# Patient Record
Sex: FEMALE | Race: WHITE | NOT HISPANIC OR LATINO | Employment: PART TIME | URBAN - NONMETROPOLITAN AREA
[De-identification: names, ages, dates, MRNs, and addresses within clinical notes are randomized per-mention and may not be internally consistent; named-entity substitution may affect disease eponyms.]

---

## 2023-03-13 ENCOUNTER — OFFICE VISIT (OUTPATIENT)
Dept: URGENT CARE | Facility: CLINIC | Age: 48
End: 2023-03-13

## 2023-03-13 VITALS
SYSTOLIC BLOOD PRESSURE: 118 MMHG | HEART RATE: 92 BPM | RESPIRATION RATE: 19 BRPM | DIASTOLIC BLOOD PRESSURE: 62 MMHG | TEMPERATURE: 97.9 F | OXYGEN SATURATION: 98 %

## 2023-03-13 DIAGNOSIS — J01.40 ACUTE NON-RECURRENT PANSINUSITIS: ICD-10-CM

## 2023-03-13 DIAGNOSIS — J20.8 ACUTE BRONCHITIS DUE TO OTHER SPECIFIED ORGANISMS: Primary | ICD-10-CM

## 2023-03-13 RX ORDER — METHYLPREDNISOLONE 4 MG/1
TABLET ORAL
Qty: 21 EACH | Refills: 0 | Status: SHIPPED | OUTPATIENT
Start: 2023-03-13

## 2023-03-13 RX ORDER — FLUTICASONE PROPIONATE 50 MCG
1 SPRAY, SUSPENSION (ML) NASAL DAILY
Qty: 10 ML | Refills: 0 | Status: SHIPPED | OUTPATIENT
Start: 2023-03-13

## 2023-03-13 RX ORDER — HYDROCHLOROTHIAZIDE 25 MG/1
25 TABLET ORAL DAILY
COMMUNITY
Start: 2022-04-08 | End: 2023-04-08

## 2023-03-13 RX ORDER — BENZONATATE 200 MG/1
200 CAPSULE ORAL 3 TIMES DAILY PRN
Qty: 20 CAPSULE | Refills: 0 | Status: SHIPPED | OUTPATIENT
Start: 2023-03-13

## 2023-03-13 RX ORDER — LOSARTAN POTASSIUM 50 MG/1
50 TABLET ORAL DAILY
COMMUNITY
Start: 2022-04-08 | End: 2023-04-08

## 2023-03-13 RX ORDER — AZITHROMYCIN 250 MG/1
TABLET, FILM COATED ORAL
Qty: 6 TABLET | Refills: 0 | Status: SHIPPED | OUTPATIENT
Start: 2023-03-13 | End: 2023-03-17

## 2023-03-13 RX ORDER — ALBUTEROL SULFATE 90 UG/1
2 AEROSOL, METERED RESPIRATORY (INHALATION)
COMMUNITY
Start: 2022-11-25

## 2023-03-13 NOTE — PROGRESS NOTES
[facility]        NAME: Ten Reeder is a 41 y o  female  : 1975    MRN: [id number]  DATE: 2023  TIME: 11:11 AM    Assessment and Plan   Acute bronchitis due to other specified organisms [J20 8]  1  Acute bronchitis due to other specified organisms  azithromycin (ZITHROMAX) 250 mg tablet    methylPREDNISolone 4 MG tablet therapy pack    fluticasone (FLONASE) 50 mcg/act nasal spray    benzonatate (TESSALON) 200 MG capsule      2  Acute non-recurrent pansinusitis  azithromycin (ZITHROMAX) 250 mg tablet    methylPREDNISolone 4 MG tablet therapy pack    fluticasone (FLONASE) 50 mcg/act nasal spray    benzonatate (TESSALON) 200 MG capsule            Patient Instructions       Follow up with PCP in 3-5 days  Proceed to  ER if symptoms worsen  Chief Complaint     Chief Complaint   Patient presents with   • Cough   • sinus congestion   • Nausea     Started 2 weeks ago, started to feel better but now symptoms are becoming worse  Had diarrhea (resolved)  OTC diabetic robitussin             History of Present Illness       HPI-patient presents with 2 weeks of sinus pressure congestion now with cough and nausea states her signs and symptoms have been off and on for the last 2 to 3 weeks    Review of Systems   Review of Systems   Constitutional: Positive for fatigue  HENT: Positive for congestion, ear pain, postnasal drip, rhinorrhea, sinus pain and sore throat  Eyes: Negative  Respiratory: Positive for cough  Cardiovascular: Negative  Gastrointestinal: Negative  Endocrine: Negative  Genitourinary: Negative  Musculoskeletal: Negative  Neurological: Positive for headaches  Hematological: Negative  Psychiatric/Behavioral: Negative            Current Medications       Current Outpatient Medications:   •  albuterol (PROVENTIL HFA,VENTOLIN HFA) 90 mcg/act inhaler, Inhale 2 puffs, Disp: , Rfl:   •  azithromycin (ZITHROMAX) 250 mg tablet, Take 2 tablets today then 1 tablet daily x 4 days, Disp: 6 tablet, Rfl: 0  •  benzonatate (TESSALON) 200 MG capsule, Take 1 capsule (200 mg total) by mouth 3 (three) times a day as needed for cough, Disp: 20 capsule, Rfl: 0  •  fluticasone (FLONASE) 50 mcg/act nasal spray, 1 spray into each nostril daily, Disp: 10 mL, Rfl: 0  •  hydrochlorothiazide (HYDRODIURIL) 25 mg tablet, Take 25 mg by mouth daily, Disp: , Rfl:   •  losartan (COZAAR) 50 mg tablet, Take 50 mg by mouth daily, Disp: , Rfl:   •  methylPREDNISolone 4 MG tablet therapy pack, Use as directed on package, Disp: 21 each, Rfl: 0    Current Allergies     Allergies as of 03/13/2023 - Reviewed 03/13/2023   Allergen Reaction Noted   • Amoxicillin-pot clavulanate Diarrhea and Hives 03/02/2012   • Bee venom Rash 10/19/2011            The following portions of the patient's history were reviewed and updated as appropriate: allergies, current medications, past family history, past medical history, past social history, past surgical history and problem list      Past Medical History:   Diagnosis Date   • Asthma    • Diabetes mellitus ([facility] [address] )    • Hypertension        History reviewed  No pertinent surgical history  No family history on file  Medications have been verified  Objective   /62   Pulse 92   Temp 97 9 °F (36 6 °C)   Resp 19   SpO2 98%        Physical Exam     Physical Exam  HENT:      Right Ear: Tympanic membrane normal       Left Ear: Tympanic membrane normal       Nose: Congestion and rhinorrhea present  Mouth/Throat:      Pharynx: Posterior oropharyngeal erythema present  Eyes:      Conjunctiva/sclera: Conjunctivae normal    Cardiovascular:      Rate and Rhythm: Normal rate and regular rhythm  Pulses: Normal pulses  Heart sounds: Normal heart sounds  Pulmonary:      Breath sounds: Wheezing and rhonchi present  Musculoskeletal:         General: Normal range of motion  Cervical back: Normal range of motion     Skin:     General: Skin is warm  Neurological:      General: No focal deficit present  Mental Status: She is alert     Psychiatric:         Mood and Affect: Mood normal

## 2023-04-01 ENCOUNTER — OFFICE VISIT (OUTPATIENT)
Dept: URGENT CARE | Facility: CLINIC | Age: 48
End: 2023-04-01

## 2023-04-01 VITALS
HEIGHT: 59 IN | BODY MASS INDEX: 29.43 KG/M2 | SYSTOLIC BLOOD PRESSURE: 122 MMHG | RESPIRATION RATE: 18 BRPM | HEART RATE: 88 BPM | WEIGHT: 146 LBS | OXYGEN SATURATION: 98 % | TEMPERATURE: 98 F | DIASTOLIC BLOOD PRESSURE: 80 MMHG

## 2023-04-01 DIAGNOSIS — R05.1 ACUTE COUGH: ICD-10-CM

## 2023-04-01 DIAGNOSIS — J06.9 ACUTE URI: ICD-10-CM

## 2023-04-01 DIAGNOSIS — J20.9 ACUTE BRONCHITIS, UNSPECIFIED ORGANISM: Primary | ICD-10-CM

## 2023-04-01 RX ORDER — DOXYCYCLINE 100 MG/1
100 TABLET ORAL 2 TIMES DAILY
Qty: 14 TABLET | Refills: 0 | Status: SHIPPED | OUTPATIENT
Start: 2023-04-01 | End: 2023-04-08

## 2023-04-01 RX ORDER — GLIMEPIRIDE 1 MG/1
1 TABLET ORAL 2 TIMES DAILY
COMMUNITY
Start: 2022-05-04 | End: 2023-07-27

## 2023-04-01 RX ORDER — METHYLPREDNISOLONE 4 MG/1
TABLET ORAL
Qty: 21 TABLET | Refills: 0 | Status: SHIPPED | OUTPATIENT
Start: 2023-04-01

## 2023-04-01 RX ORDER — DEXTROMETHORPHAN HYDROBROMIDE AND PROMETHAZINE HYDROCHLORIDE 15; 6.25 MG/5ML; MG/5ML
5 SOLUTION ORAL 4 TIMES DAILY PRN
Qty: 118 ML | Refills: 0 | Status: SHIPPED | OUTPATIENT
Start: 2023-04-01

## 2023-04-01 NOTE — PATIENT INSTRUCTIONS
Take prescribed medication as instructed  Tylenol or ibuprofen for pain or fever  Drink plenty of fluids and rest   May try tea with honey, teaspoon of honey, throat lozenges, Chloraseptic spray for throat sore throat symptoms  If no improvement, follow-up with PCP  If symptoms worsen, return or go to the ER  Follow up with PCP in 3-5 days  Proceed to  ER if symptoms worsen  Upper Respiratory Infection   WHAT YOU NEED TO KNOW:   An upper respiratory infection is also called a cold  It can affect your nose, throat, ears, and sinuses  Cold symptoms are usually worst for the first 3 to 5 days  Most people get better in 7 to 14 days  You may continue to cough for 2 to 3 weeks  Colds are caused by viruses and do not get better with antibiotics  DISCHARGE INSTRUCTIONS:   Call your local emergency number (911 in the [address] [place] [address],[address]) if:   You have chest pain or trouble breathing  Return to the emergency department if:   You have a fever over 102ºF (39ºC)  Call your doctor if:   You have a low fever  Your sore throat gets worse or you see white or yellow spots in your throat  Your symptoms get worse after 3 to 5 days or are not better in 14 days  You have a rash anywhere on your skin  You have large, tender lumps in your neck  You have thick, green, or yellow drainage from your nose  You cough up thick yellow, green, or bloody mucus  You have a bad earache  You have questions or concerns about your condition or care  Medicines: You may need any of the following:  Decongestants  help reduce nasal congestion and help you breathe more easily  If you take decongestant pills, they may make you feel restless or cause problems with your sleep  Do not use decongestant sprays for more than a few days  Cough suppressants  help reduce coughing  Ask your healthcare provider which type of cough medicine is best for you  NSAIDs , such as ibuprofen, help decrease swelling, pain, and fever   NSAIDs can cause stomach bleeding or kidney problems in certain people  If you take blood thinner medicine, always ask your healthcare provider if NSAIDs are safe for you  Always read the medicine label and follow directions  Acetaminophen  decreases pain and fever  It is available without a doctor's order  Ask how much to take and how often to take it  Follow directions  Read the labels of all other medicines you are using to see if they also contain acetaminophen, or ask your doctor or pharmacist  Acetaminophen can cause liver damage if not taken correctly  Take your medicine as directed  Contact your healthcare provider if you think your medicine is not helping or if you have side effects  Tell your provider if you are allergic to any medicine  Keep a list of the medicines, vitamins, and herbs you take  Include the amounts, and when and why you take them  Bring the list or the pill bottles to follow-up visits  Carry your medicine list with you in case of an emergency  Self-care:   Rest as much as possible  Slowly start to do more each day  Drink more liquids as directed  Liquids will help thin and loosen mucus so you can cough it up  Liquids will also help prevent dehydration  Liquids that help prevent dehydration include water, fruit juice, and broth  Do not drink liquids that contain caffeine  Caffeine can increase your risk for dehydration  Ask your healthcare provider how much liquid to drink each day  Soothe a sore throat  Gargle with warm salt water  Make salt water by dissolving ¼ teaspoon salt in 1 cup warm water  You may also suck on hard candy or throat lozenges  You may use a sore throat spray  Use a humidifier or vaporizer  Use a cool mist humidifier or a vaporizer to increase air moisture in your home  This may make it easier for you to breathe and help decrease your cough  Use saline nasal drops as directed  These help relieve congestion      Apply petroleum-based jelly around the outside of your nostrils  This can decrease irritation from blowing your nose  Do not smoke  Nicotine and other chemicals in cigarettes and cigars can make your symptoms worse  They can also cause infections such as bronchitis or pneumonia  Ask your healthcare provider for information if you currently smoke and need help to quit  E-cigarettes or smokeless tobacco still contain nicotine  Talk to your healthcare provider before you use these products  Prevent a cold: Wash your hands often  Use soap and water every time you wash your hands  Rub your soapy hands together, lacing your fingers  Use the fingers of one hand to scrub under the nails of the other hand  Wash for at least 20 seconds  Rinse with warm, running water for several seconds  Then dry your hands  Use hand  gel if soap and water are not available  Do not touch your eyes or mouth without washing your hands first          Cover a sneeze or cough  Use a tissue that covers your mouth and nose  Put the used tissue in the trash right away  Use the bend of your arm if a tissue is not available  Wash your hands well with soap and water or use a hand   Do not stand close to anyone who is sneezing or coughing  Try to stay away from others while you are sick  This is especially important during the first 2 to 3 days when the virus is more easily spread  Wait until a fever, cough, or other symptoms are gone before you return to work or other regular activities  Do not share items while you are sick  This includes food, drinks, eating utensils, and dishes  Follow up with your doctor as directed:  Write down your questions so you remember to ask them during your visits  © Copyright [employer] 2022 Information is for End User's use only and may not be sold, redistributed or otherwise used for commercial purposes  The above information is an  only   It is not intended as medical advice for individual conditions or treatments  Talk to your doctor, nurse or pharmacist before following any medical regimen to see if it is safe and effective for you  Acute Bronchitis   WHAT YOU NEED TO KNOW:   Acute bronchitis is swelling and irritation in your lungs  It is usually caused by a virus and most often happens in the winter  Bronchitis may also be caused by bacteria or by a chemical irritant, such as smoke  DISCHARGE INSTRUCTIONS:   Return to the emergency department if:   You cough up blood  Your lips or fingernails turn blue  You feel like you are not getting enough air when you breathe  Call your doctor if:   Your symptoms do not go away or get worse, even after treatment  Your cough does not get better within 4 weeks  You have questions or concerns about your condition or care  Medicines: You may  need any of the following:  Cough suppressants  decrease your urge to cough  Decongestants  help loosen mucus in your lungs and make it easier to cough up  This can help you breathe easier  Inhalers  may be given  Your healthcare provider may give you one or more inhalers to help you breathe easier and cough less  An inhaler gives your medicine to open your airways  Ask your healthcare provider to show you how to use your inhaler correctly  Antibiotics  may be given for up to 5 days if your bronchitis is caused by bacteria  Acetaminophen  decreases pain and fever  It is available without a doctor's order  Ask how much to take and how often to take it  Follow directions  Read the labels of all other medicines you are using to see if they also contain acetaminophen, or ask your doctor or pharmacist  Acetaminophen can cause liver damage if not taken correctly  NSAIDs  help decrease swelling and pain or fever  This medicine is available with or without a doctor's order  NSAIDs can cause stomach bleeding or kidney problems in certain people   If you take blood thinner medicine, always ask your healthcare provider if NSAIDs are safe for you  Always read the medicine label and follow directions  Take your medicine as directed  Contact your healthcare provider if you think your medicine is not helping or if you have side effects  Tell your provider if you are allergic to any medicine  Keep a list of the medicines, vitamins, and herbs you take  Include the amounts, and when and why you take them  Bring the list or the pill bottles to follow-up visits  Carry your medicine list with you in case of an emergency  Self-care:   Drink liquids as directed  You may need to drink more liquids than usual to stay hydrated  Ask how much liquid to drink each day and which liquids are best for you  Use a cool mist humidifier  to increase air moisture in your home  This may make it easier for you to breathe and help decrease your cough  Get more rest   Rest helps your body to heal  Slowly start to do more each day  Rest when you feel it is needed  Avoid irritants in the air  Avoid chemicals, fumes, and dust  Wear a face mask if you must work around dust or fumes  Stay inside on days when air pollution levels are high  If you have allergies, stay inside when pollen counts are high  Do not use aerosol products, such as spray-on deodorant, bug spray, and hair spray  Do not smoke or be around others who are smoking  Nicotine and other chemicals in cigarettes and cigars can cause lung damage  Ask your healthcare provider for information if you currently smoke and need help to quit  E-cigarettes or smokeless tobacco still contain nicotine  Talk to your healthcare provider before you use these products  Prevent acute bronchitis:       Ask about vaccines you may need  Get a flu vaccine each year as soon as recommended, usually in September or October  Ask your healthcare provider if you should also get a pneumonia or COVID-19 vaccine   Your healthcare provider can tell you if you should also get other vaccines, and when to get them  Prevent the spread of germs  You can decrease your risk for acute bronchitis and other illnesses by doing the following:     Wash your hands often with soap and water  Carry germ-killing hand lotion or gel with you  You can use the lotion or gel to clean your hands when soap and water are not available  Do not touch your eyes, nose, or mouth unless you have washed your hands first     Always cover your mouth when you cough to prevent the spread of germs  It is best to cough into a tissue or your shirt sleeve instead of into your hand  Ask those around you to cover their mouths when they cough  Try to avoid people who have a cold or the flu  If you are sick, stay away from others as much as possible  Follow up with your doctor as directed:  Write down questions you have so you will remember to ask them during your follow-up visits  © Copyright [employer] 2022 Information is for End User's use only and may not be sold, redistributed or otherwise used for commercial purposes  The above information is an  only  It is not intended as medical advice for individual conditions or treatments  Talk to your doctor, nurse or pharmacist before following any medical regimen to see if it is safe and effective for you  Acute Cough   WHAT YOU NEED TO KNOW:   An acute cough can last up to 3 weeks  Common causes of an acute cough include a cold, allergies, or a lung infection  DISCHARGE INSTRUCTIONS:   Return to the emergency department if:   You have trouble breathing or feel short of breath  You cough up blood, or you see blood in your mucus  You faint or feel weak or dizzy  You have chest pain when you cough or take a deep breath  You have new wheezing  Contact your healthcare provider if:   You have a fever  Your cough lasts longer than 4 weeks  Your symptoms do not improve with treatment      You have questions or concerns about your condition or care     Medicines:   Medicines  may be needed to stop the cough, decrease swelling in your airways, or help open your airways  Medicine may also be given to help you cough up mucus  Ask your healthcare provider what over-the-counter medicines you can take  If you have an infection caused by bacteria, you may need antibiotics  Take your medicine as directed  Contact your healthcare provider if you think your medicine is not helping or if you have side effects  Tell your provider if you are allergic to any medicine  Keep a list of the medicines, vitamins, and herbs you take  Include the amounts, and when and why you take them  Bring the list or the pill bottles to follow-up visits  Carry your medicine list with you in case of an emergency  Manage your symptoms:   Do not smoke and stay away from others who smoke  Nicotine and other chemicals in cigarettes and cigars can cause lung damage and make your cough worse  Ask your healthcare provider for information if you currently smoke and need help to quit  E-cigarettes or smokeless tobacco still contain nicotine  Talk to your healthcare provider before you use these products  Drink extra liquids as directed  Liquids will help thin and loosen mucus so you can cough it up  Liquids will also help prevent dehydration  Examples of good liquids to drink include water, fruit juice, and broth  Do not drink liquids that contain caffeine  Caffeine can increase your risk for dehydration  Ask your healthcare provider how much liquid to drink each day  Rest as directed  Do not do activities that make your cough worse, such as exercise  Use a humidifier or vaporizer  Use a cool mist humidifier or a vaporizer to increase air moisture in your home  This may make it easier for you to breathe and help decrease your cough  Eat 2 to 5 mL of honey 2 times each day  Honey can help thin mucus and decrease your cough  Use cough drops or lozenges    These can help decrease throat irritation and your cough  Follow up with your healthcare provider as directed:  Write down your questions so you remember to ask them during your visits  © Copyright Lev [employer]ettles 2022 Information is for End User's use only and may not be sold, redistributed or otherwise used for commercial purposes  The above information is an  only  It is not intended as medical advice for individual conditions or treatments  Talk to your doctor, nurse or pharmacist before following any medical regimen to see if it is safe and effective for you

## 2023-04-01 NOTE — PROGRESS NOTES
[facility]        NAME: Ro Guthrie is a 41 y o  female  : 1975    MRN: [id number]  DATE: 2023  TIME: 11:28 AM    Assessment and Plan   Acute bronchitis, unspecified organism [J20 9]  1  Acute bronchitis, unspecified organism  doxycycline (ADOXA) 100 MG tablet    methylPREDNISolone 4 MG tablet therapy pack      2  Acute URI  doxycycline (ADOXA) 100 MG tablet      3  Acute cough  Promethazine-DM (PHENERGAN-DM) 6 25-15 mg/5 mL oral syrup    methylPREDNISolone 4 MG tablet therapy pack        Treating symptoms with Doxy, cough syrup, Medrol Dosepak  Patient has an albuterol inhaler at home, states she has not been using more than than normal due to not having shortness of breath  Advised follow-up with PCP  Advised return to the ER symptoms worsen  Patient Instructions     Take prescribed medication as instructed  Tylenol or ibuprofen for pain or fever  Drink plenty of fluids and rest   May try tea with honey, teaspoon of honey, throat lozenges, Chloraseptic spray for throat sore throat symptoms  If no improvement, follow-up with PCP  If symptoms worsen, return or go to the ER  Follow up with PCP in 3-5 days  Proceed to  ER if symptoms worsen  Chief Complaint     Chief Complaint   Patient presents with   • Earache     Started about a week ago, otalgia of left ear  No discharge  Has tried heat compression with no relief  • Sore Throat     c/o chest congestion and sore throat since about a week ago  Pt was in last month dx with bronchitis  Denies SOB, wheezing  Continues with productive cough, white mucous  Denies fever  Denies sick contacts  Has tried robitussin and motrin with some relief  Has not used albuterol  History of Present Illness       82-year-old female presents for 1 week of left ear pain, sore throat, chest congestion, and upper respiratory congestion    PT states she was diagnosed with bronchitis a month ago and was on antibiotics and several the medications  PT states the cough has been consistent and is worse at night  Denies any sick contacts  Denies any fever, chills, chest pain, shortness of breath, abdominal pain, nausea, vomiting, diarrhea  Review of Systems   Review of Systems   Constitutional: Negative  HENT: Positive for congestion, ear pain (left), postnasal drip, rhinorrhea, sinus pressure and sore throat  Negative for ear discharge  Eyes: Negative  Respiratory: Positive for cough  Negative for shortness of breath and wheezing  Cardiovascular: Negative  Gastrointestinal: Negative  Musculoskeletal: Negative  Skin: Negative  Neurological: Negative            Current Medications       Current Outpatient Medications:   •  albuterol (PROVENTIL HFA,VENTOLIN HFA) 90 mcg/act inhaler, Inhale 2 puffs, Disp: , Rfl:   •  benzonatate (TESSALON) 200 MG capsule, Take 1 capsule (200 mg total) by mouth 3 (three) times a day as needed for cough, Disp: 20 capsule, Rfl: 0  •  doxycycline (ADOXA) 100 MG tablet, Take 1 tablet (100 mg total) by mouth 2 (two) times a day for 7 days, Disp: 14 tablet, Rfl: 0  •  glimepiride (AMARYL) 1 mg tablet, Take 1 mg by mouth 2 (two) times a day, Disp: , Rfl:   •  hydrochlorothiazide (HYDRODIURIL) 25 mg tablet, Take 25 mg by mouth daily, Disp: , Rfl:   •  losartan (COZAAR) 50 mg tablet, Take 50 mg by mouth daily, Disp: , Rfl:   •  methylPREDNISolone 4 MG tablet therapy pack, Use as directed on package, Disp: 21 tablet, Rfl: 0  •  Promethazine-DM (PHENERGAN-DM) 6 25-15 mg/5 mL oral syrup, Take 5 mL by mouth 4 (four) times a day as needed for cough, Disp: 118 mL, Rfl: 0  •  fluticasone (FLONASE) 50 mcg/act nasal spray, 1 spray into each nostril daily (Patient not taking: Reported on 4/1/2023), Disp: 10 mL, Rfl: 0  •  methylPREDNISolone 4 MG tablet therapy pack, Use as directed on package (Patient not taking: Reported on 4/1/2023), Disp: 21 each, Rfl: 0    Current Allergies     Allergies as of 04/01/2023 - "Reviewed 04/01/2023   Allergen Reaction Noted   • Amoxicillin-pot clavulanate Diarrhea and Hives 03/02/2012   • Dust mite extract Allergic Rhinitis 10/19/2011   • Bee venom Rash 10/19/2011            The following portions of the patient's history were reviewed and updated as appropriate: allergies, current medications, past family history, past medical history, past social history, past surgical history and problem list      Past Medical History:   Diagnosis Date   • Asthma    • Diabetes mellitus ([facility] [address] )    • Hypertension        History reviewed  No pertinent surgical history  History reviewed  No pertinent family history  Medications have been verified  Objective   /80   Pulse 88   Temp 98 °F (36 7 °C)   Resp 18   Ht 4' 11\" (1 499 m)   Wt 66 2 kg (146 lb)   SpO2 98%   BMI 29 49 kg/m²        Physical Exam     Physical Exam  Constitutional:       General: She is not in acute distress  Appearance: Normal appearance  She is not ill-appearing, toxic-appearing or diaphoretic  HENT:      Head: Normocephalic and atraumatic  Right Ear: Tympanic membrane, ear canal and external ear normal       Left Ear: Tympanic membrane, ear canal and external ear normal       Nose: Congestion present  Mouth/Throat:      Mouth: Mucous membranes are moist       Pharynx: Oropharynx is clear  Posterior oropharyngeal erythema (mild with postnasal drip) present  No oropharyngeal exudate  Eyes:      Extraocular Movements: Extraocular movements intact  Conjunctiva/sclera: Conjunctivae normal       Pupils: Pupils are equal, round, and reactive to light  Cardiovascular:      Rate and Rhythm: Normal rate and regular rhythm  Pulses: Normal pulses  Heart sounds: Normal heart sounds  No murmur heard  No friction rub  No gallop  Pulmonary:      Effort: Pulmonary effort is normal  No respiratory distress  Breath sounds: Normal breath sounds  No stridor  No wheezing, rhonchi or rales   " Chest:      Chest wall: No tenderness  Musculoskeletal:      Cervical back: Normal range of motion and neck supple  No tenderness  Lymphadenopathy:      Cervical: No cervical adenopathy  Skin:     General: Skin is warm and dry  Capillary Refill: Capillary refill takes less than 2 seconds  Findings: No rash  Neurological:      General: No focal deficit present  Mental Status: She is alert and oriented to person, place, and time     Psychiatric:         Mood and Affect: Mood normal          Behavior: Behavior normal

## 2023-09-14 ENCOUNTER — OFFICE VISIT (OUTPATIENT)
Dept: URGENT CARE | Facility: CLINIC | Age: 48
End: 2023-09-14
Payer: COMMERCIAL

## 2023-09-14 VITALS
HEART RATE: 81 BPM | HEIGHT: 58 IN | DIASTOLIC BLOOD PRESSURE: 100 MMHG | RESPIRATION RATE: 18 BRPM | SYSTOLIC BLOOD PRESSURE: 149 MMHG | WEIGHT: 150 LBS | TEMPERATURE: 98 F | BODY MASS INDEX: 31.49 KG/M2 | OXYGEN SATURATION: 99 %

## 2023-09-14 DIAGNOSIS — H81.13 BENIGN PAROXYSMAL POSITIONAL VERTIGO DUE TO BILATERAL VESTIBULAR DISORDER: ICD-10-CM

## 2023-09-14 DIAGNOSIS — H65.03 BILATERAL ACUTE SEROUS OTITIS MEDIA, RECURRENCE NOT SPECIFIED: ICD-10-CM

## 2023-09-14 DIAGNOSIS — R05.1 ACUTE COUGH: Primary | ICD-10-CM

## 2023-09-14 DIAGNOSIS — J02.9 SORE THROAT: ICD-10-CM

## 2023-09-14 PROCEDURE — 99213 OFFICE O/P EST LOW 20 MIN: CPT

## 2023-09-14 RX ORDER — AZITHROMYCIN 250 MG/1
TABLET, FILM COATED ORAL
Qty: 6 TABLET | Refills: 0 | Status: SHIPPED | OUTPATIENT
Start: 2023-09-14 | End: 2023-09-18

## 2023-09-14 RX ORDER — BENZONATATE 200 MG/1
200 CAPSULE ORAL 3 TIMES DAILY PRN
Qty: 20 CAPSULE | Refills: 0 | Status: SHIPPED | OUTPATIENT
Start: 2023-09-14

## 2023-09-14 RX ORDER — MECLIZINE HYDROCHLORIDE 25 MG/1
25 TABLET ORAL 3 TIMES DAILY PRN
Qty: 30 TABLET | Refills: 0 | Status: SHIPPED | OUTPATIENT
Start: 2023-09-14

## 2023-09-14 NOTE — LETTER
September 14, 2023     Patient: Robby Mccartney   YOB: 1975   Date of Visit: 9/14/2023       To Whom it May Concern:    Robby Mccartney was seen in my clinic on 9/14/2023. She may return to work on 9/18/2023. If you have any questions or concerns, please don't hesitate to call.          Sincerely,          Ebb Schilder, PA-C        CC: No Recipients

## 2023-09-14 NOTE — PROGRESS NOTES
[facility]        NAME: Donovan Hannon is a 41 y.o. female  : 1975    MRN: [id number]  DATE: 2023  TIME: 3:18 PM    Assessment and Plan   Acute cough [R05.1]  1. Acute cough  azithromycin (ZITHROMAX) 250 mg tablet    benzonatate (TESSALON) 200 MG capsule      2. Sore throat        3. Bilateral acute serous otitis media, recurrence not specified  azithromycin (ZITHROMAX) 250 mg tablet      4. Benign paroxysmal positional vertigo due to bilateral vestibular disorder  meclizine (ANTIVERT) 25 mg tablet        Bilateral earache, cough, sore throat, and occasional dizziness with movements/at rest.  Bilateral ear effusion, no posterior pharyngeal erythema. Currently afebrile. Blood pressure slightly elevated-patient states she will call her family doctor to schedule an appointment for this issue. No changes in vision. Advised follow-up with family doctor. Advised with ER symptoms worsen. Patient Instructions     Take prescribed medication as instructed. Tylenol for pain or fever. May try warm tea with honey, teaspoon of honey, throat lozenges, warm salt gargles for sore throat. Avoid sudden quick movements. Follow-up with your family doctor due to high blood pressure reading today of 149/100. If you have any worsening of symptoms, go to the emergency room. Follow up with PCP in 3-5 days. Proceed to  ER if symptoms worsen. Chief Complaint     Chief Complaint   Patient presents with   • Sore Throat   • Dizziness   • Earache     Bilateral ear pain symptoms started a week ago. No at home covid test. Also tired    • Fatigue         History of Present Illness       68-year-old female presents the clinic with 1 week of cough, sore throat, bilateral earache, and occasional dizziness that occurs with certain movements as well as at rest.  Denies sick contacts. 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   PT has not tried any over-the-counter medications. Denies any fever, chills, chest pain, shortness of breath, change in vision, syncope, numbness, tingling. Review of Systems   Review of Systems   Constitutional: Negative. HENT: Positive for congestion, ear pain and sore throat. Eyes: Negative. Respiratory: Positive for cough. Negative for shortness of breath and wheezing. Cardiovascular: Negative. Gastrointestinal: Negative. Musculoskeletal: Negative. Skin: Negative. Neurological: Positive for dizziness. Negative for tremors, seizures, syncope, facial asymmetry, speech difficulty, weakness, light-headedness, numbness and headaches.          Current Medications       Current Outpatient Medications:   •  albuterol (PROVENTIL HFA,VENTOLIN HFA) 90 mcg/act inhaler, Inhale 2 puffs, Disp: , Rfl:   •  azithromycin (ZITHROMAX) 250 mg tablet, Take 2 tablets today then 1 tablet daily x 4 days, Disp: 6 tablet, Rfl: 0  •  benzonatate (TESSALON) 200 MG capsule, Take 1 capsule (200 mg total) by mouth 3 (three) times a day as needed for cough, Disp: 20 capsule, Rfl: 0  •  meclizine (ANTIVERT) 25 mg tablet, Take 1 tablet (25 mg total) by mouth 3 (three) times a day as needed for dizziness, Disp: 30 tablet, Rfl: 0  •  benzonatate (TESSALON) 200 MG capsule, Take 1 capsule (200 mg total) by mouth 3 (three) times a day as needed for cough (Patient not taking: Reported on 9/14/2023), Disp: 20 capsule, Rfl: 0  •  fluticasone (FLONASE) 50 mcg/act nasal spray, 1 spray into each nostril daily (Patient not taking: Reported on 4/1/2023), Disp: 10 mL, Rfl: 0  •  glimepiride (AMARYL) 1 mg tablet, Take 1 mg by mouth 2 (two) times a day, Disp: , Rfl:   •  hydrochlorothiazide (HYDRODIURIL) 25 mg tablet, Take 25 mg by mouth daily, Disp: , Rfl:   •  losartan (COZAAR) 50 mg tablet, Take 50 mg by mouth daily, Disp: , Rfl:   •  methylPREDNISolone 4 MG tablet therapy pack, Use as directed on package (Patient not taking: Reported on 4/1/2023), Disp: 21 each, Rfl: 0  •  methylPREDNISolone 4 MG tablet therapy pack, Use as directed on package (Patient not taking: Reported on 9/14/2023), Disp: 21 tablet, Rfl: 0  •  Promethazine-DM (PHENERGAN-DM) 6.25-15 mg/5 mL oral syrup, Take 5 mL by mouth 4 (four) times a day as needed for cough (Patient not taking: Reported on 9/14/2023), Disp: 118 mL, Rfl: 0    Current Allergies     Allergies as of 09/14/2023 - Reviewed 09/14/2023   Allergen Reaction Noted   • Amoxicillin-pot clavulanate Diarrhea and Hives 03/02/2012   • Dust mite extract Allergic Rhinitis 10/19/2011   • Bee venom Rash 10/19/2011            The following portions of the patient's history were reviewed and updated as appropriate: allergies, current medications, past family history, past medical history, past social history, past surgical history and problem list.     Past Medical History:   Diagnosis Date   • Asthma    • Diabetes mellitus ([address])    • Hypertension        History reviewed. No pertinent surgical history. History reviewed. No pertinent family history. Medications have been verified. Objective   /100   Pulse 81   Temp 98 °F (36.7 °C)   Resp 18   Ht 4' 10" (1.473 m)   Wt 68 kg (150 lb)   SpO2 99%   BMI 31.35 kg/m²        Physical Exam     Physical Exam  Constitutional:       General: She is not in acute distress. Appearance: Normal appearance. She is not ill-appearing. HENT:      Head: Normocephalic and atraumatic. Right Ear: Ear canal and external ear normal.      Left Ear: Ear canal and external ear normal.      Nose: Congestion present. Mouth/Throat:      Mouth: Mucous membranes are moist.      Pharynx: Oropharynx is clear. No posterior oropharyngeal erythema. Eyes:      Extraocular Movements: Extraocular movements intact. Conjunctiva/sclera: Conjunctivae normal.      Pupils: Pupils are equal, round, and reactive to light. Cardiovascular:      Rate and Rhythm: Normal rate and regular rhythm.       Pulses: Normal pulses. Heart sounds: Normal heart sounds. Pulmonary:      Effort: Pulmonary effort is normal. No respiratory distress. Breath sounds: Normal breath sounds. No stridor. No wheezing, rhonchi or rales. Chest:      Chest wall: No tenderness. Musculoskeletal:      Cervical back: Normal range of motion and neck supple. No tenderness. Lymphadenopathy:      Cervical: No cervical adenopathy. Skin:     General: Skin is warm and dry. Capillary Refill: Capillary refill takes less than 2 seconds. Findings: No rash. Neurological:      General: No focal deficit present. Mental Status: She is alert and oriented to person, place, and time. Mental status is at baseline. GCS: GCS eye subscore is 4. GCS verbal subscore is 5. GCS motor subscore is 6. Cranial Nerves: Cranial nerves 2-12 are intact. No cranial nerve deficit. Sensory: Sensation is intact. No sensory deficit. Motor: Motor function is intact. No weakness. Coordination: Coordination is intact. Coordination normal.      Gait: Gait is intact. Gait normal.      Deep Tendon Reflexes: Reflexes are normal and symmetric.  Reflexes normal.   Psychiatric:         Mood and Affect: Mood normal.         Behavior: Behavior normal.

## 2023-09-14 NOTE — PATIENT INSTRUCTIONS
Take prescribed medication as instructed. Tylenol for pain or fever. May try warm tea with honey, teaspoon of honey, throat lozenges, warm salt gargles for sore throat. Avoid sudden quick movements. Follow-up with your family doctor due to high blood pressure reading today of 149/100. If you have any worsening of symptoms, go to the emergency room. Follow up with PCP in 3-5 days. Proceed to  ER if symptoms worsen. Benign Paroxysmal Positional Vertigo   WHAT YOU NEED TO KNOW:   BPPV is an inner ear condition that causes you to suddenly feel dizzy. Benign means it is not serious or life-threatening. BPPV is caused by a problem with the nerves and structure of your inner ear. BPPV happens when small pieces of calcium break loose and lump together in one of your inner ear canals. DISCHARGE INSTRUCTIONS:   Return to the emergency department if:   You fall during a BPPV episode and are injured. You have a severe headache that does not go away. You have new changes in your vision or feel weak or confused. You have problems hearing, or you have ringing or buzzing in your ears. Contact your healthcare provider if:   Your BPPV symptoms do not go away or they return. You have problems with your balance, or you are falling often. You have new or increased nausea or vomiting with vertigo. You feel anxious or depressed and do not want to leave your home. You have questions or concerns about your condition or care. Medicines:   Medicines  may be recommended or prescribed to treat dizziness or nausea. Take your medicine as directed. Contact your healthcare provider if you think your medicine is not helping or if you have side effects. Tell your provider if you are allergic to any medicine. Keep a list of the medicines, vitamins, and herbs you take. Include the amounts, and when and why you take them. Bring the list or the pill bottles to follow-up visits.  Carry your medicine list with you in case of an emergency. Prevent your symptoms:   Try to avoid sudden head movements. Stand up and lie down slowly. Raise and support your head when you lie down. Place pillows under your upper back and head or rest in a recliner. Change your position often when you are lying down. Try not to lie with your head on the same side for long periods of time. Roll over slowly. Wear protective gear  when you ride a bike or play sports. A helmet helps protect your head from injury. Follow up with your healthcare provider as directed: You may need to return in 1 month to check the progress of your treatment. Write down your questions so you remember to ask them during your visits. © Copyright [facility] 2022 Information is for End User's use only and may not be sold, redistributed or otherwise used for commercial purposes. The above information is an  only. It is not intended as medical advice for individual conditions or treatments. Talk to your doctor, nurse or pharmacist before following any medical regimen to see if it is safe and effective for you. Fluid In The Ear (Serous Otitis Media)   WHAT YOU NEED TO KNOW:   HÉCTOR is fluid trapped in the middle of your ear behind your eardrum. This condition usually develops without signs or symptoms of an ear infection. Serous otitis media may be caused by an upper respiratory infection or allergies. It is most common in the fall and early spring. DISCHARGE INSTRUCTIONS:   Call your doctor if:   You develop severe ear pain. The outside of your ear is red or swollen. You have fluid coming from your ear. You have questions or concerns about your condition or care. How to stay healthy:   Wash your hands often throughout the day. Use soap and water. Rub your soapy hands together, lacing your fingers, for at least 20 seconds. Rinse with warm, running water. Dry your hands with a clean towel or paper towel.  Use hand  that contains alcohol if soap and water are not available. Teach children how to wash their hands and use hand . Avoid people who are sick. Some germs are easily and quickly spread through contact. Follow up with your doctor as directed:  Write down your questions so you remember to ask them during your visits. © Copyright [employer] 2022 Information is for End User's use only and may not be sold, redistributed or otherwise used for commercial purposes. The above information is an  only. It is not intended as medical advice for individual conditions or treatments. Talk to your doctor, nurse or pharmacist before following any medical regimen to see if it is safe and effective for you. Acute Cough   WHAT YOU NEED TO KNOW:   An acute cough can last up to 3 weeks. Common causes of an acute cough include a cold, allergies, or a lung infection. DISCHARGE INSTRUCTIONS:   Return to the emergency department if:   You have trouble breathing or feel short of breath. You cough up blood, or you see blood in your mucus. You faint or feel weak or dizzy. You have chest pain when you cough or take a deep breath. You have new wheezing. Contact your healthcare provider if:   You have a fever. Your cough lasts longer than 4 weeks. Your symptoms do not improve with treatment. You have questions or concerns about your condition or care. Medicines:   Medicines  may be needed to stop the cough, decrease swelling in your airways, or help open your airways. Medicine may also be given to help you cough up mucus. Ask your healthcare provider what over-the-counter medicines you can take. If you have an infection caused by bacteria, you may need antibiotics. Take your medicine as directed. Contact your healthcare provider if you think your medicine is not helping or if you have side effects. Tell your provider if you are allergic to any medicine.  Keep a list of the medicines, vitamins, and herbs you take. Include the amounts, and when and why you take them. Bring the list or the pill bottles to follow-up visits. Carry your medicine list with you in case of an emergency. Manage your symptoms:   Do not smoke and stay away from others who smoke. Nicotine and other chemicals in cigarettes and cigars can cause lung damage and make your cough worse. Ask your healthcare provider for information if you currently smoke and need help to quit. E-cigarettes or smokeless tobacco still contain nicotine. Talk to your healthcare provider before you use these products. Drink extra liquids as directed. Liquids will help thin and loosen mucus so you can cough it up. Liquids will also help prevent dehydration. Examples of good liquids to drink include water, fruit juice, and broth. Do not drink liquids that contain caffeine. Caffeine can increase your risk for dehydration. Ask your healthcare provider how much liquid to drink each day. Rest as directed. Do not do activities that make your cough worse, such as exercise. Use a humidifier or vaporizer. Use a cool mist humidifier or a vaporizer to increase air moisture in your home. This may make it easier for you to breathe and help decrease your cough. Eat 2 to 5 mL of honey 2 times each day. Honey can help thin mucus and decrease your cough. Use cough drops or lozenges. These can help decrease throat irritation and your cough. Follow up with your healthcare provider as directed:  Write down your questions so you remember to ask them during your visits. © Copyright [facility] 2022 Information is for End User's use only and may not be sold, redistributed or otherwise used for commercial purposes. The above information is an  only. It is not intended as medical advice for individual conditions or treatments.  Talk to your doctor, nurse or pharmacist before following any medical regimen to see if it is safe and effective for you.

## 2023-11-27 ENCOUNTER — OFFICE VISIT (OUTPATIENT)
Dept: URGENT CARE | Facility: CLINIC | Age: 48
End: 2023-11-27
Payer: COMMERCIAL

## 2023-11-27 VITALS
RESPIRATION RATE: 18 BRPM | OXYGEN SATURATION: 98 % | SYSTOLIC BLOOD PRESSURE: 120 MMHG | HEART RATE: 73 BPM | TEMPERATURE: 98.5 F | DIASTOLIC BLOOD PRESSURE: 60 MMHG

## 2023-11-27 DIAGNOSIS — U07.1 COVID-19 VIRUS INFECTION: Primary | ICD-10-CM

## 2023-11-27 LAB
SARS-COV-2 AG UPPER RESP QL IA: POSITIVE
VALID CONTROL: ABNORMAL

## 2023-11-27 PROCEDURE — 99213 OFFICE O/P EST LOW 20 MIN: CPT | Performed by: STUDENT IN AN ORGANIZED HEALTH CARE EDUCATION/TRAINING PROGRAM

## 2023-11-27 PROCEDURE — 87811 SARS-COV-2 COVID19 W/OPTIC: CPT | Performed by: STUDENT IN AN ORGANIZED HEALTH CARE EDUCATION/TRAINING PROGRAM

## 2023-11-27 NOTE — PATIENT INSTRUCTIONS
Recommended 5 days of isolation would end on 11/29/2023. Recommended to wear mask for 5 days after this isolation. Take prescribed medication as instructed. Continue with at home Tylenol/ibuprofen as needed for pain/fever. Make sure to stay well-hydrated and rest.  Simple diet of bananas, rice, applesauce, toast, crackers-until normal appetite resumes. Multivitamin. Vitamin D3 2000 IU daily. Vitamin C 1000 mg twice daily. Zinc 50mg daily. Follow-up with family medicine referral as discussed. Follow up with PCP in 3-5 days. Proceed to  ER if symptoms worsen.

## 2023-11-27 NOTE — LETTER
November 27, 2023     Patient: Marce Zarco   YOB: 1975   Date of Visit: 11/27/2023       To Whom it May Concern:    Marce Zarco was seen in my clinic on 11/27/2023. She will be in isolation till 11/29/23 and should strictly wear a mask for 5 days after that. If you have any questions or concerns, please don't hesitate to call.          Sincerely,          Loan Ames DO        CC: No Recipients

## 2023-11-27 NOTE — PROGRESS NOTES
[facility]        NAME: Any Corrales is a 57 y.o. female  : 1975    MRN: [id number]    Assessment and Plan   COVID-19 virus infection [U07.1]  1. COVID-19 virus infection  Poct Covid 19 Rapid Antigen Test          Results for orders placed or performed in visit on 23   Poct Covid 19 Rapid Antigen Test   Result Value Ref Range    POCT SARS-CoV-2 Ag Positive (A) Negative    VALID CONTROL Valid      Day 3 of covid 19 symptoms. No concern for cardiopulmonary distress. No indication for paxlovid. Discussed symptomatic and supportive measures. Provided isolation guidelines    Patient Instructions       Follow up with PCP in 3-5 days. Proceed to  ER if symptoms worsen. Chief Complaint     Chief Complaint   Patient presents with    Cough    Fever    Generalized Body Aches    Fatigue    Dizziness    Earache    Sore Throat     Started 2 days ago  OTC ibuprofen  Request note for work         History of Present Illness       HPI      Onset of symptoms 2 days ago, today is day 3 of symptoms. Otc robitussin and ibuprofen with some improvement. Reports tmax 100.3F, chills, myalgias, fatigue, dizziness, ear pressure, sore throat. Denies nausea, vomiting, loss of taste or smell. Review of Systems   Review of Systems   Constitutional:  Positive for chills, fatigue and fever. HENT:  Positive for congestion, ear pain and sore throat. Negative for ear discharge. Eyes:  Negative for pain and visual disturbance. Respiratory:  Positive for cough. Negative for chest tightness and shortness of breath. Cardiovascular:  Negative for chest pain and palpitations. Gastrointestinal:  Negative for abdominal pain, constipation, diarrhea, nausea and vomiting. Genitourinary:  Negative for dysuria, hematuria and menstrual problem. Musculoskeletal:  Positive for myalgias. Negative for arthralgias and back pain. Skin:  Negative for color change and rash.    Neurological:  Positive for dizziness. Negative for seizures and syncope. Psychiatric/Behavioral:  Negative for dysphoric mood and suicidal ideas. All other systems reviewed and are negative.       Current Medications       Current Outpatient Medications:     albuterol (PROVENTIL HFA,VENTOLIN HFA) 90 mcg/act inhaler, Inhale 2 puffs, Disp: , Rfl:     benzonatate (TESSALON) 200 MG capsule, Take 1 capsule (200 mg total) by mouth 3 (three) times a day as needed for cough (Patient not taking: Reported on 9/14/2023), Disp: 20 capsule, Rfl: 0    benzonatate (TESSALON) 200 MG capsule, Take 1 capsule (200 mg total) by mouth 3 (three) times a day as needed for cough, Disp: 20 capsule, Rfl: 0    fluticasone (FLONASE) 50 mcg/act nasal spray, 1 spray into each nostril daily (Patient not taking: Reported on 4/1/2023), Disp: 10 mL, Rfl: 0    glimepiride (AMARYL) 1 mg tablet, Take 1 mg by mouth 2 (two) times a day, Disp: , Rfl:     hydrochlorothiazide (HYDRODIURIL) 25 mg tablet, Take 25 mg by mouth daily, Disp: , Rfl:     losartan (COZAAR) 50 mg tablet, Take 50 mg by mouth daily, Disp: , Rfl:     meclizine (ANTIVERT) 25 mg tablet, Take 1 tablet (25 mg total) by mouth 3 (three) times a day as needed for dizziness, Disp: 30 tablet, Rfl: 0    methylPREDNISolone 4 MG tablet therapy pack, Use as directed on package (Patient not taking: Reported on 4/1/2023), Disp: 21 each, Rfl: 0    methylPREDNISolone 4 MG tablet therapy pack, Use as directed on package (Patient not taking: Reported on 9/14/2023), Disp: 21 tablet, Rfl: 0    Promethazine-DM (PHENERGAN-DM) 6.25-15 mg/5 mL oral syrup, Take 5 mL by mouth 4 (four) times a day as needed for cough (Patient not taking: Reported on 9/14/2023), Disp: 118 mL, Rfl: 0    Current Allergies     Allergies as of 11/27/2023 - Reviewed 11/27/2023   Allergen Reaction Noted    Amoxicillin-pot clavulanate Diarrhea and Hives 03/02/2012    Dust mite extract Allergic Rhinitis 10/19/2011    Bee venom Rash 10/19/2011            The following portions of the patient's history were reviewed and updated as appropriate: allergies, current medications, past family history, past medical history, past social history, past surgical history and problem list.     Past Medical History:   Diagnosis Date    Asthma     Diabetes mellitus ([address])     Hypertension        History reviewed. No pertinent surgical history. History reviewed. No pertinent family history. Medications have been verified. Objective   /60   Pulse 73   Temp 98.5 °F (36.9 °C)   Resp 18   SpO2 98%        Physical Exam     Physical Exam  Constitutional:       General: She is not in acute distress. Appearance: Normal appearance. HENT:      Head: Normocephalic and atraumatic. Right Ear: Tympanic membrane and ear canal normal.      Left Ear: Tympanic membrane and ear canal normal.      Mouth/Throat:      Mouth: Mucous membranes are moist.      Pharynx: Oropharynx is clear. No oropharyngeal exudate or posterior oropharyngeal erythema. Eyes:      Extraocular Movements: Extraocular movements intact. Conjunctiva/sclera: Conjunctivae normal.   Cardiovascular:      Rate and Rhythm: Normal rate and regular rhythm. Pulmonary:      Effort: Pulmonary effort is normal. No respiratory distress. Breath sounds: No stridor. No wheezing, rhonchi or rales. Musculoskeletal:      Cervical back: Normal range of motion. Skin:     General: Skin is warm and dry. Neurological:      General: No focal deficit present. Mental Status: She is alert and oriented to person, place, and time.    Psychiatric:         Mood and Affect: Mood normal.         Behavior: Behavior normal.

## 2024-01-24 ENCOUNTER — OFFICE VISIT (OUTPATIENT)
Dept: URGENT CARE | Facility: CLINIC | Age: 49
End: 2024-01-24
Payer: COMMERCIAL

## 2024-01-24 VITALS
OXYGEN SATURATION: 99 % | BODY MASS INDEX: 29.23 KG/M2 | SYSTOLIC BLOOD PRESSURE: 130 MMHG | HEART RATE: 80 BPM | RESPIRATION RATE: 18 BRPM | DIASTOLIC BLOOD PRESSURE: 58 MMHG | TEMPERATURE: 97.3 F | WEIGHT: 145 LBS | HEIGHT: 59 IN

## 2024-01-24 DIAGNOSIS — K04.7 DENTAL ABSCESS: Primary | ICD-10-CM

## 2024-01-24 DIAGNOSIS — J01.00 ACUTE NON-RECURRENT MAXILLARY SINUSITIS: ICD-10-CM

## 2024-01-24 PROCEDURE — 99213 OFFICE O/P EST LOW 20 MIN: CPT | Performed by: STUDENT IN AN ORGANIZED HEALTH CARE EDUCATION/TRAINING PROGRAM

## 2024-01-24 RX ORDER — CLINDAMYCIN HYDROCHLORIDE 150 MG/1
150 CAPSULE ORAL 4 TIMES DAILY
Qty: 40 CAPSULE | Refills: 0 | Status: SHIPPED | OUTPATIENT
Start: 2024-01-24 | End: 2024-02-03

## 2024-01-24 NOTE — PROGRESS NOTES
[facility]        NAME: Cody Fung is a 48 y.o. female  : 1975    MRN: [id number]    Assessment and Plan   Dental abscess [K04.7]  1. Dental abscess  clindamycin (CLEOCIN) 150 mg capsule      2. Acute non-recurrent maxillary sinusitis  clindamycin (CLEOCIN) 150 mg capsule        Due to presence of dental abscess and unilateral sinus infection, will treat with 10-day course of clindamycin which will cover for both infections. Maurice's angina was considered as a differential during evaluation and ruled out.  Provided warning signs of deep soft tissue infection.  Recommend follow-up with dentist ASAP.      Patient Instructions     See wrap up for details  Follow up with PCP in 3-5 days.  Proceed to  ER if symptoms worsen.    Chief Complaint     Chief Complaint   Patient presents with    Sinusitis     Been going on for a week     Abscess     On the roof of her mouth and it has been draining since  and she his a diabetic          History of Present Illness       HPI    P/w onset of sinus infections for a week and abscess on the top of her mouth for a few days.  Denies foul order  Reports drainage is blood, no pus from the abscess.  Reports sinus pressure over left maxillary sinus with burning, postnasal drip, nausea, cough, ear pain  Denies fever, chills, vomiting, sore throat, dyspnea, dysphagia, choking or neck pain, neck rigidity.    Review of Systems   Review of Systems   Constitutional:  Negative for chills and fever.   HENT:  Positive for dental problem, ear pain, postnasal drip, sinus pressure and sinus pain. Negative for sore throat.    Eyes:  Negative for pain and visual disturbance.   Respiratory:  Positive for cough. Negative for chest tightness and shortness of breath.    Cardiovascular:  Negative for chest pain and palpitations.   Gastrointestinal:  Positive for nausea. Negative for abdominal pain, constipation, diarrhea and vomiting.   Genitourinary:  Negative for dysuria,  hematuria and menstrual problem.   Musculoskeletal:  Negative for arthralgias and back pain.   Skin:  Negative for color change and rash.   Neurological:  Negative for seizures and syncope.   Psychiatric/Behavioral:  Negative for dysphoric mood and suicidal ideas.    All other systems reviewed and are negative.    Current Medications       Current Outpatient Medications:     clindamycin (CLEOCIN) 150 mg capsule, Take 1 capsule (150 mg total) by mouth 4 (four) times a day for 10 days, Disp: 40 capsule, Rfl: 0    albuterol (PROVENTIL HFA,VENTOLIN HFA) 90 mcg/act inhaler, Inhale 2 puffs (Patient not taking: Reported on 1/24/2024), Disp: , Rfl:     benzonatate (TESSALON) 200 MG capsule, Take 1 capsule (200 mg total) by mouth 3 (three) times a day as needed for cough (Patient not taking: Reported on 9/14/2023), Disp: 20 capsule, Rfl: 0    benzonatate (TESSALON) 200 MG capsule, Take 1 capsule (200 mg total) by mouth 3 (three) times a day as needed for cough (Patient not taking: Reported on 1/24/2024), Disp: 20 capsule, Rfl: 0    fluticasone (FLONASE) 50 mcg/act nasal spray, 1 spray into each nostril daily (Patient not taking: Reported on 4/1/2023), Disp: 10 mL, Rfl: 0    glimepiride (AMARYL) 1 mg tablet, Take 1 mg by mouth 2 (two) times a day, Disp: , Rfl:     hydrochlorothiazide (HYDRODIURIL) 25 mg tablet, Take 25 mg by mouth daily, Disp: , Rfl:     losartan (COZAAR) 50 mg tablet, Take 50 mg by mouth daily, Disp: , Rfl:     meclizine (ANTIVERT) 25 mg tablet, Take 1 tablet (25 mg total) by mouth 3 (three) times a day as needed for dizziness (Patient not taking: Reported on 1/24/2024), Disp: 30 tablet, Rfl: 0    methylPREDNISolone 4 MG tablet therapy pack, Use as directed on package (Patient not taking: Reported on 4/1/2023), Disp: 21 each, Rfl: 0    methylPREDNISolone 4 MG tablet therapy pack, Use as directed on package (Patient not taking: Reported on 9/14/2023), Disp: 21 tablet, Rfl: 0    Promethazine-DM (PHENERGAN-DM)  "6.25-15 mg/5 mL oral syrup, Take 5 mL by mouth 4 (four) times a day as needed for cough (Patient not taking: Reported on 9/14/2023), Disp: 118 mL, Rfl: 0    Current Allergies     Allergies as of 01/24/2024 - Reviewed 01/24/2024   Allergen Reaction Noted    Amoxicillin-pot clavulanate Diarrhea and Hives 03/02/2012    Dust mite extract Allergic Rhinitis 10/19/2011    Bee venom Rash 10/19/2011            The following portions of the patient's history were reviewed and updated as appropriate: allergies, current medications, past family history, past medical history, past social history, past surgical history and problem list.     Past Medical History:   Diagnosis Date    Asthma     Diabetes mellitus (HCC)     Hypertension        History reviewed. No pertinent surgical history.    History reviewed. No pertinent family history.      Medications have been verified.        Objective   /58   Pulse 80   Temp (!) 97.3 °F (36.3 °C)   Resp 18   Ht 4' 11\" (1.499 m)   Wt 65.8 kg (145 lb)   SpO2 99%   BMI 29.29 kg/m²        Physical Exam     Physical Exam  Constitutional:       General: She is not in acute distress.     Appearance: Normal appearance.   HENT:      Head: Normocephalic and atraumatic.      Right Ear: Tympanic membrane and ear canal normal.      Left Ear: Tympanic membrane and ear canal normal.      Nose:      Right Sinus: No maxillary sinus tenderness or frontal sinus tenderness.      Left Sinus: Maxillary sinus tenderness present. No frontal sinus tenderness.      Mouth/Throat:      Mouth: Mucous membranes are moist.      Dentition: Abnormal dentition. Gingival swelling, dental caries, dental abscesses and gum lesions present.      Pharynx: Oropharynx is clear. No posterior oropharyngeal erythema.        Comments: Multiple chipped and missing teeth with significant cavities and caries noted throughout the mouth.  Eyes:      Extraocular Movements: Extraocular movements intact.      Conjunctiva/sclera: " Conjunctivae normal.   Cardiovascular:      Rate and Rhythm: Normal rate.   Pulmonary:      Effort: Pulmonary effort is normal. No respiratory distress.   Musculoskeletal:      Cervical back: No rigidity.   Lymphadenopathy:      Cervical: No cervical adenopathy.   Skin:     General: Skin is warm and dry.   Neurological:      General: No focal deficit present.      Mental Status: She is alert and oriented to person, place, and time.   Psychiatric:         Mood and Affect: Mood normal.         Behavior: Behavior normal.

## 2025-02-01 ENCOUNTER — OFFICE VISIT (OUTPATIENT)
Dept: URGENT CARE | Facility: CLINIC | Age: 50
End: 2025-02-01
Payer: COMMERCIAL

## 2025-02-01 VITALS
SYSTOLIC BLOOD PRESSURE: 140 MMHG | BODY MASS INDEX: 26.9 KG/M2 | RESPIRATION RATE: 18 BRPM | OXYGEN SATURATION: 98 % | DIASTOLIC BLOOD PRESSURE: 80 MMHG | TEMPERATURE: 97.9 F | WEIGHT: 137 LBS | HEART RATE: 107 BPM | HEIGHT: 60 IN

## 2025-02-01 DIAGNOSIS — J20.9 ACUTE BRONCHITIS, UNSPECIFIED ORGANISM: Primary | ICD-10-CM

## 2025-02-01 PROCEDURE — 99213 OFFICE O/P EST LOW 20 MIN: CPT

## 2025-02-01 RX ORDER — ALBUTEROL SULFATE 90 UG/1
2 INHALANT RESPIRATORY (INHALATION) EVERY 6 HOURS PRN
Qty: 8.5 G | Refills: 0 | Status: SHIPPED | OUTPATIENT
Start: 2025-02-01

## 2025-02-01 RX ORDER — AZITHROMYCIN 250 MG/1
TABLET, FILM COATED ORAL
Qty: 6 TABLET | Refills: 0 | Status: SHIPPED | OUTPATIENT
Start: 2025-02-01 | End: 2025-02-05

## 2025-02-01 RX ORDER — DEXTROMETHORPHAN HYDROBROMIDE AND PROMETHAZINE HYDROCHLORIDE 15; 6.25 MG/5ML; MG/5ML
5 SYRUP ORAL 4 TIMES DAILY PRN
Qty: 118 ML | Refills: 0 | Status: SHIPPED | OUTPATIENT
Start: 2025-02-01

## 2025-02-01 NOTE — PATIENT INSTRUCTIONS
Take phenergan DM and albuterol inhaler as prescribed  Take over the counter Mucinex during the day    Fluids and rest (Warm water with honey and lemon)  Tylenol/Ibuprofen for pain fever     Follow up with PCP in 3-5 days.  Proceed to  ER if symptoms worsen.    If tests are performed, our office will contact you with results only if changes need to made to the care plan discussed with you at the visit. You can review your full results on [facility].

## 2025-02-01 NOTE — PROGRESS NOTES
[facility]        NAME: Cody Fung is a 49 y.o. female  : 1975    MRN: [id number]  DATE: 2025  TIME: 2:57 PM    Assessment and Plan   Acute bronchitis, unspecified organism [J20.9]  1. Acute bronchitis, unspecified organism  azithromycin (ZITHROMAX) 250 mg tablet    albuterol (ProAir HFA) 90 mcg/act inhaler    promethazine-dextromethorphan (PHENERGAN-DM) 6.25-15 mg/5 mL oral syrup      Lungs with occasional wheeze      Patient Instructions   Take phenergan DM and albuterol inhaler as prescribed  Take over the counter Mucinex during the day    Fluids and rest (Warm water with honey and lemon)  Tylenol/Ibuprofen for pain fever     Follow up with PCP in 3-5 days.  Proceed to  ER if symptoms worsen.    If tests are performed, our office will contact you with results only if changes need to made to the care plan discussed with you at the visit. You can review your full results on [facility].    Chief Complaint     Chief Complaint   Patient presents with    Cough     Cough for 3 weeks. Did a video visit with [facility] on 25. Was given flonase and bromphen but has no relief         History of Present Illness       Patient presents with cough and congestion worse at night for last 3 weeks. Denies fever. Hx asthma and out of inhaler. Current smoker 1/2 ppd     Cough        Review of Systems   Review of Systems   Respiratory:  Positive for cough.    All other systems reviewed and are negative.        Current Medications       Current Outpatient Medications:     albuterol (ProAir HFA) 90 mcg/act inhaler, Inhale 2 puffs every 6 (six) hours as needed for wheezing, Disp: 8.5 g, Rfl: 0    azithromycin (ZITHROMAX) 250 mg tablet, Take 2 tablets today then 1 tablet daily x 4 days, Disp: 6 tablet, Rfl: 0    promethazine-dextromethorphan (PHENERGAN-DM) 6.25-15 mg/5 mL oral syrup, Take 5 mL by mouth 4 (four) times a day as needed for cough, Disp: 118 mL, Rfl: 0    albuterol (PROVENTIL  HFA,VENTOLIN HFA) 90 mcg/act inhaler, Inhale 2 puffs (Patient not taking: Reported on 2/1/2025), Disp: , Rfl:     benzonatate (TESSALON) 200 MG capsule, Take 1 capsule (200 mg total) by mouth 3 (three) times a day as needed for cough (Patient not taking: Reported on 2/1/2025), Disp: 20 capsule, Rfl: 0    benzonatate (TESSALON) 200 MG capsule, Take 1 capsule (200 mg total) by mouth 3 (three) times a day as needed for cough (Patient not taking: Reported on 2/1/2025), Disp: 20 capsule, Rfl: 0    fluticasone (FLONASE) 50 mcg/act nasal spray, 1 spray into each nostril daily (Patient not taking: Reported on 2/1/2025), Disp: 10 mL, Rfl: 0    glimepiride (AMARYL) 1 mg tablet, Take 1 mg by mouth 2 (two) times a day, Disp: , Rfl:     hydrochlorothiazide (HYDRODIURIL) 25 mg tablet, Take 25 mg by mouth daily, Disp: , Rfl:     losartan (COZAAR) 50 mg tablet, Take 50 mg by mouth daily, Disp: , Rfl:     meclizine (ANTIVERT) 25 mg tablet, Take 1 tablet (25 mg total) by mouth 3 (three) times a day as needed for dizziness (Patient not taking: Reported on 2/1/2025), Disp: 30 tablet, Rfl: 0    methylPREDNISolone 4 MG tablet therapy pack, Use as directed on package (Patient not taking: Reported on 2/1/2025), Disp: 21 each, Rfl: 0    methylPREDNISolone 4 MG tablet therapy pack, Use as directed on package (Patient not taking: Reported on 2/1/2025), Disp: 21 tablet, Rfl: 0    Promethazine-DM (PHENERGAN-DM) 6.25-15 mg/5 mL oral syrup, Take 5 mL by mouth 4 (four) times a day as needed for cough (Patient not taking: Reported on 2/1/2025), Disp: 118 mL, Rfl: 0    Current Allergies     Allergies as of 02/01/2025 - Reviewed 02/01/2025   Allergen Reaction Noted    Amoxicillin-pot clavulanate Diarrhea and Hives 03/02/2012    Dust mite extract Allergic Rhinitis 10/19/2011    Bee venom Rash 10/19/2011            The following portions of the patient's history were reviewed and updated as appropriate: allergies, current medications, past family  "history, past medical history, past social history, past surgical history and problem list.     Past Medical History:   Diagnosis Date    Asthma     Diabetes mellitus (HCC)     Hypertension        History reviewed. No pertinent surgical history.    Family History   Problem Relation Age of Onset    Breast cancer Mother     Lung cancer Father          Medications have been verified.        Objective   /80   Pulse (!) 107   Temp 97.9 °F (36.6 °C) (Tympanic)   Resp 18   Ht 4' 11.84\" (1.52 m)   Wt 62.1 kg (137 lb)   SpO2 98%   BMI 26.90 kg/m²        Physical Exam     Physical Exam  Vitals and nursing note reviewed.   Constitutional:       General: She is not in acute distress.  HENT:      Head: Normocephalic.      Right Ear: Tympanic membrane and ear canal normal.      Left Ear: Tympanic membrane and ear canal normal.      Nose: No congestion.      Mouth/Throat:      Mouth: Mucous membranes are moist.      Pharynx: No pharyngeal swelling, oropharyngeal exudate or posterior oropharyngeal erythema.      Tonsils: No tonsillar exudate.   Eyes:      Conjunctiva/sclera: Conjunctivae normal.      Pupils: Pupils are equal, round, and reactive to light.   Cardiovascular:      Rate and Rhythm: Normal rate and regular rhythm.      Heart sounds: No murmur heard.  Pulmonary:      Effort: Pulmonary effort is normal. No respiratory distress.      Breath sounds: Wheezing present. No rhonchi or rales.   Lymphadenopathy:      Cervical: No cervical adenopathy.   Skin:     General: Skin is warm and dry.   Neurological:      Mental Status: She is alert.                   "